# Patient Record
Sex: MALE | Race: WHITE | NOT HISPANIC OR LATINO | Employment: FULL TIME | URBAN - METROPOLITAN AREA
[De-identification: names, ages, dates, MRNs, and addresses within clinical notes are randomized per-mention and may not be internally consistent; named-entity substitution may affect disease eponyms.]

---

## 2024-09-03 ENCOUNTER — OFFICE VISIT (OUTPATIENT)
Dept: PODIATRY | Facility: CLINIC | Age: 39
End: 2024-09-03
Payer: COMMERCIAL

## 2024-09-03 VITALS
DIASTOLIC BLOOD PRESSURE: 75 MMHG | WEIGHT: 224 LBS | HEART RATE: 55 BPM | BODY MASS INDEX: 30.34 KG/M2 | TEMPERATURE: 97.5 F | OXYGEN SATURATION: 98 % | SYSTOLIC BLOOD PRESSURE: 144 MMHG | HEIGHT: 72 IN

## 2024-09-03 DIAGNOSIS — M72.2 PLANTAR FASCIITIS: Primary | ICD-10-CM

## 2024-09-03 DIAGNOSIS — M21.862 ACQUIRED POSTERIOR EQUINUS OF BOTH LOWER EXTREMITIES: ICD-10-CM

## 2024-09-03 DIAGNOSIS — M21.861 ACQUIRED POSTERIOR EQUINUS OF BOTH LOWER EXTREMITIES: ICD-10-CM

## 2024-09-03 DIAGNOSIS — M79.671 PAIN IN RIGHT FOOT: ICD-10-CM

## 2024-09-03 PROCEDURE — 99213 OFFICE O/P EST LOW 20 MIN: CPT | Performed by: PODIATRIST

## 2024-09-03 RX ORDER — MELOXICAM 7.5 MG/1
7.5 TABLET ORAL DAILY
Qty: 30 TABLET | Refills: 11 | Status: SHIPPED | OUTPATIENT
Start: 2024-09-03 | End: 2025-09-03

## 2024-09-03 ASSESSMENT — PATIENT HEALTH QUESTIONNAIRE - PHQ9
2. FEELING DOWN, DEPRESSED OR HOPELESS: NOT AT ALL
SUM OF ALL RESPONSES TO PHQ9 QUESTIONS 1 AND 2: 0
1. LITTLE INTEREST OR PLEASURE IN DOING THINGS: NOT AT ALL

## 2024-09-03 NOTE — PROGRESS NOTES
Initial Podiatric Office Visit:    Chief Complaint: No chief complaint on file.          HPI:  This 39 y.o. male with PMH indicated below presents complaining of right heel pain for roughly a year.  Patient states that he switched his boots for work and that has helped his pain some.  Patient states that originally he had bilateral heel pain but now it is mostly on his right.  Patient states that there is a dull constant ache on the bottom of his right heel.  Patient states that he has always had tightness on his right side including his back hip and calf.  Patient states that when he runs he has tenderness to the plantar right heel that extends into his Achilles.  Patient has since stopped running due to the pain.  Patient has not been doing any formal stretching or icing or anti-inflammatories.  Patient rates his pain resting today a 3 out of 10 on the pain scale and states that he can become a 10 out of 10 on the pain scale with severe exacerbation.  He denies any trauma.  Patient denies any constitutional symptoms at this time.  No other pedal complaints.    PCP:  Silvana Gonzalez MD:    [facility]  No past medical history on file.:    MEDICATIONS  No current outpatient medications on file.     No current facility-administered medications for this visit.   :  Not on File:  No past surgical history on file.  No family history on file.:  Social History     Socioeconomic History    Marital status:        REVIEW OF SYSTEMS    GENERAL: No weight loss, malaise or fevers.  HEENT: Negative for frequent or significant headaches,   RESPIRATORY: Negative for cough, wheezing or shortness of breath.  CARDIOVASCULAR: Negative for chest pain, leg swelling or palpitations.  GI: Negative for abdominal discomfort,  MUSCULOSKELETAL: no back pain      SKIN: Negative for lesions, rash, and itching.  NEURO: no numbess, tingling or burning in feet      Physical Exam:       Patient is alert and oriented x 3 in NAD    Vascular:   2/4  palpable Dorsalis Pedis and Posterior Tibial Pulses B/L  Capillary Fill time < 3 seconds to digits 1-5 B/L  Skin temperature warm to warm tibial tuberosity to the digits B/L  No edema.  No varicosities    Neurological:   Intact light touch/epicritic sensation B/L   Intact sharp/dull sensations  Intact protective sensation, no clonus b/l.    No focal neurological deficits    Dermatological:   Skin appears well hydrated and supple. good color, texture, turgor. No open lesions present. Hyperkeratosis not noted. Webspaces clean and dry 1-4 b/l. Nails 1-5 b/l appear normal.    Musculoskeletal/Orthopaedic:   General foot morphology: Rectus medial longitudinal arch  +5/5 muscle strength Dorsiflexion, Plantarflexion, Inversion, Eversion B/L  ROM of the 1st MTPJ is full without pain or crepitus b/l.  ROM of the MTJ/STJ is full without pain or crepitus b/l.  Ankle joint ROM is decreased  B/L.  Significant decrease in ankle dorsiflexion right worse than left with the knee extended with slight increase with knee bent.  Pain to palpation of the medial calcaneal tubercle attachment of the plantar fascia right worse than left.  No pain to palpation of the insertion of Achilles tendon on palpation of the Achilles tendon.        ASSESSMENT:   1. Plantar fasciitis  meloxicam (Mobic) 7.5 mg tablet    Referral to Physical Therapy      2. Pain in right foot  meloxicam (Mobic) 7.5 mg tablet    Referral to Physical Therapy      3. Acquired posterior equinus of both lower extremities  meloxicam (Mobic) 7.5 mg tablet    Referral to Physical Therapy        -Patient examined evaluated  - Discussed with patient        Plan:    - Initial Office Visit   - Etiology and treatment options were discussed with the patient.  Cause and treatment for plantars fasciitis.  - Discussed with patient at home stretches as well as proper shoe gear and to refrain from walking barefoot.  - Discussed icing and topical and oral anti-inflammatories.  - Referral for  physical therapy given to patient as well as home stretches.  - Prescription for meloxicam sent to pharmacy for patient to take for 1 month.  - Discussed with patient that if pain persist steroids may be needed orally and/or injection to the plantar fascia.  - Patient to follow-up in 1 month    Myrna Brian DPM    A total of 30 minutes was spent in formulation of this note, review of charts, exercises, medications, treatment with a minimum of 50% of the time spent in face to face with with the patient.  All questions and concerns were answered to the patients satisfaction.     Off note, use of vocal Dragon dictation system was used to dictate this document.  All proper spelling and grammatical errors may not have been corrected prior to final submission.

## 2024-10-01 ENCOUNTER — OFFICE VISIT (OUTPATIENT)
Dept: PODIATRY | Facility: CLINIC | Age: 39
End: 2024-10-01
Payer: COMMERCIAL

## 2024-10-01 VITALS
BODY MASS INDEX: 30.2 KG/M2 | OXYGEN SATURATION: 97 % | SYSTOLIC BLOOD PRESSURE: 125 MMHG | WEIGHT: 223 LBS | TEMPERATURE: 97.3 F | DIASTOLIC BLOOD PRESSURE: 70 MMHG | HEIGHT: 72 IN | HEART RATE: 53 BPM

## 2024-10-01 DIAGNOSIS — M79.671 PAIN IN RIGHT FOOT: ICD-10-CM

## 2024-10-01 DIAGNOSIS — M21.862 ACQUIRED POSTERIOR EQUINUS OF BOTH LOWER EXTREMITIES: ICD-10-CM

## 2024-10-01 DIAGNOSIS — M21.861 ACQUIRED POSTERIOR EQUINUS OF BOTH LOWER EXTREMITIES: ICD-10-CM

## 2024-10-01 DIAGNOSIS — M72.2 PLANTAR FASCIITIS: ICD-10-CM

## 2024-10-01 PROCEDURE — 99213 OFFICE O/P EST LOW 20 MIN: CPT | Performed by: PODIATRIST

## 2024-10-01 RX ORDER — METHYLPREDNISOLONE 4 MG/1
4 TABLET ORAL DAILY
Qty: 7 TABLET | Refills: 0 | Status: SHIPPED | OUTPATIENT
Start: 2024-10-01 | End: 2024-10-08

## 2024-10-01 RX ORDER — MELOXICAM 7.5 MG/1
7.5 TABLET ORAL DAILY
Qty: 30 TABLET | Refills: 11 | Status: SHIPPED | OUTPATIENT
Start: 2024-10-01 | End: 2025-10-01

## 2024-10-01 ASSESSMENT — PATIENT HEALTH QUESTIONNAIRE - PHQ9
SUM OF ALL RESPONSES TO PHQ9 QUESTIONS 1 AND 2: 0
2. FEELING DOWN, DEPRESSED OR HOPELESS: NOT AT ALL
1. LITTLE INTEREST OR PLEASURE IN DOING THINGS: NOT AT ALL

## 2024-10-01 NOTE — PROGRESS NOTES
Follow-up visit    Chief Complaint:   Chief Complaint   Patient presents with    Follow-up    Plantar Fasciitis           HPI:  This 39 y.o. male with PMH indicated below presents for follow-up of right heel plantar fascial pain.  Patient has been taking the meloxicam and doing stretches as instructed.  Patient has not yet started physical therapy since last evaluation roughly a month ago.  Patient states that overall his pain has lessened and he believes that he is about 25% better.  He states that he does not have consistent pain anymore and that the pain is not as bad first thing in the morning.  Patient still believes there is some room for improvement.  He denies any trauma or new pedal concerns.  Denies any constitutional symptoms at this time.  No other pedal complaints.    PCP:  Silvana Gonzalez MD:    [facility]  History reviewed. No pertinent past medical history.:          REVIEW OF SYSTEMS    All negative except what is listed in HPI      Physical Exam:       Patient is alert and oriented x 3 in NAD    Vascular:   2/4 palpable Dorsalis Pedis and Posterior Tibial Pulses B/L  Capillary Fill time < 3 seconds to digits 1-5 B/L  Skin temperature warm to warm tibial tuberosity to the digits B/L  No edema.  No varicosities    Neurological:   Intact light touch/epicritic sensation B/L   Intact sharp/dull sensations  Intact protective sensation, no clonus b/l.    No focal neurological deficits    Dermatological:   Skin appears well hydrated and supple. good color, texture, turgor. No open lesions present. Hyperkeratosis not noted. Webspaces clean and dry 1-4 b/l. Nails 1-5 b/l appear normal.    Musculoskeletal/Orthopaedic:   General foot morphology: Rectus medial longitudinal arch  +5/5 muscle strength Dorsiflexion, Plantarflexion, Inversion, Eversion B/L  ROM of the 1st MTPJ is full without pain or crepitus b/l.  ROM of the MTJ/STJ is full without pain or crepitus b/l.  Ankle joint ROM is decreased  B/L.  Significant  decrease in ankle dorsiflexion right worse than left with the knee extended with slight increase with knee bent.  Tenderness to palpation of the medial calcaneal tubercle attachment of the plantar fascia right worse than left.  No pain to palpation of the insertion of Achilles tendon on palpation of the Achilles tendon.        ASSESSMENT:   1. Plantar fasciitis  methylPREDNISolone (MedroL) 4 mg tablet    meloxicam (Mobic) 7.5 mg tablet      2. Pain in right foot  methylPREDNISolone (MedroL) 4 mg tablet    meloxicam (Mobic) 7.5 mg tablet      3. Acquired posterior equinus of both lower extremities  meloxicam (Mobic) 7.5 mg tablet                  Plan:    -Patient examined evaluated  - Overall patient has shown improvement with oral meloxicam.  - Discussed with patient doing a short burst steroid taper followed by another month of meloxicam to help decrease the inflammation in the heel.  - Patient to continue doing daily stretching, protected weightbearing as well as to start physical therapy.  - Prescription for Medrol Dosepak and Mobic sent to the pharmacy.  Patient to follow-up in 1 month    Myrna Brian DPM    A total of 20 minutes was spent in formulation of this note, review of charts, exercises, medications, treatment with a minimum of 50% of the time spent in face to face with with the patient.  All questions and concerns were answered to the patients satisfaction.     Off note, use of vocal Dragon dictation system was used to dictate this document.  All proper spelling and grammatical errors may not have been corrected prior to final submission.

## 2024-11-05 ENCOUNTER — OFFICE VISIT (OUTPATIENT)
Dept: PODIATRY | Facility: CLINIC | Age: 39
End: 2024-11-05
Payer: COMMERCIAL

## 2024-11-05 VITALS
SYSTOLIC BLOOD PRESSURE: 144 MMHG | TEMPERATURE: 97.1 F | WEIGHT: 228 LBS | BODY MASS INDEX: 30.88 KG/M2 | HEART RATE: 70 BPM | HEIGHT: 72 IN | DIASTOLIC BLOOD PRESSURE: 85 MMHG | OXYGEN SATURATION: 97 %

## 2024-11-05 DIAGNOSIS — M21.862 ACQUIRED POSTERIOR EQUINUS OF BOTH LOWER EXTREMITIES: ICD-10-CM

## 2024-11-05 DIAGNOSIS — M72.2 PLANTAR FASCIITIS: Primary | ICD-10-CM

## 2024-11-05 DIAGNOSIS — M21.861 ACQUIRED POSTERIOR EQUINUS OF BOTH LOWER EXTREMITIES: ICD-10-CM

## 2024-11-05 DIAGNOSIS — M79.671 PAIN IN RIGHT FOOT: ICD-10-CM

## 2024-11-05 PROCEDURE — 99213 OFFICE O/P EST LOW 20 MIN: CPT | Performed by: PODIATRIST

## 2024-11-05 ASSESSMENT — PATIENT HEALTH QUESTIONNAIRE - PHQ9
SUM OF ALL RESPONSES TO PHQ9 QUESTIONS 1 AND 2: 0
1. LITTLE INTEREST OR PLEASURE IN DOING THINGS: NOT AT ALL
2. FEELING DOWN, DEPRESSED OR HOPELESS: NOT AT ALL

## 2024-11-05 NOTE — PROGRESS NOTES
Follow-up visit    Chief Complaint:   Chief Complaint   Patient presents with    Plantar Fasciitis           HPI:  This 39 y.o. male with PMH indicated below presents for follow-up of right heel plantar fascial pain.  Patient has been taking the meloxicam and doing stretches as instructed.  Patient has not yet started physical therapy since last evaluation  and has been doing home PT. Overall patient states that he is doing extremely well.  He states that he does not have pain throughout the entire day and is over 60 to 70% better.  He would like to start running again and gradually get back into increasing his running mileage.  He denies any trauma or new pedal concerns.  Denies any constitutional symptoms at this time.  No other pedal complaints.    PCP:  Silvana Gonzalez MD:    [facility]  History reviewed. No pertinent past medical history.:          REVIEW OF SYSTEMS    All negative except what is listed in HPI      Physical Exam:       Patient is alert and oriented x 3 in NAD    Vascular:   2/4 palpable Dorsalis Pedis and Posterior Tibial Pulses B/L  Capillary Fill time < 3 seconds to digits 1-5 B/L  Skin temperature warm to warm tibial tuberosity to the digits B/L  No edema.  No varicosities    Neurological:   Intact light touch/epicritic sensation B/L   Intact sharp/dull sensations  Intact protective sensation, no clonus b/l.    No focal neurological deficits    Dermatological:   Skin appears well hydrated and supple. good color, texture, turgor. No open lesions present. Hyperkeratosis not noted. Webspaces clean and dry 1-4 b/l. Nails 1-5 b/l appear normal.    Musculoskeletal/Orthopaedic:   General foot morphology: Rectus medial longitudinal arch  +5/5 muscle strength Dorsiflexion, Plantarflexion, Inversion, Eversion B/L  ROM of the 1st MTPJ is full without pain or crepitus b/l.  ROM of the MTJ/STJ is full without pain or crepitus b/l.  Ankle joint ROM is decreased  B/L.  Noticed decrease in ankle dorsiflexion  right worse than left with the knee extended with slight increase with knee bent.,  Improved from initial presentation.  No tenderness to palpation of the medial calcaneal tubercle attachment of the plantar fascia right worse than left.  No pain to palpation of the insertion of Achilles tendon on palpation of the Achilles tendon.        ASSESSMENT:   1. Plantar fasciitis        2. Pain in right foot        3. Acquired posterior equinus of both lower extremities              Plan:    -Patient examined and evaluated  - Overall patient has shown improvement with oral meloxicam and stretching at home.  - Patient finished his oral steroid taper and says that that greatly helped as well.  - Patient to continue doing daily stretching, protected weightbearing as well as to start physical therapy.  - Patient may gradually start back into running routine.  Discussed increasing stretching and may need to take meloxicam as needed on days where the plantar fascia is sore.  -Patient to follow-up as needed    Myrna Brian DPM    A total of 20 minutes was spent in formulation of this note, review of charts, exercises, medications, treatment with a minimum of 50% of the time spent in face to face with with the patient.  All questions and concerns were answered to the patients satisfaction.     Off note, use of vocal Dragon dictation system was used to dictate this document.  All proper spelling and grammatical errors may not have been corrected prior to final submission.